# Patient Record
Sex: MALE | Race: WHITE
[De-identification: names, ages, dates, MRNs, and addresses within clinical notes are randomized per-mention and may not be internally consistent; named-entity substitution may affect disease eponyms.]

---

## 2018-07-31 ENCOUNTER — HOSPITAL ENCOUNTER (EMERGENCY)
Dept: HOSPITAL 25 - ED | Age: 58
Discharge: HOME | End: 2018-07-31
Payer: COMMERCIAL

## 2018-07-31 VITALS — DIASTOLIC BLOOD PRESSURE: 92 MMHG | SYSTOLIC BLOOD PRESSURE: 148 MMHG

## 2018-07-31 DIAGNOSIS — S49.92XA: Primary | ICD-10-CM
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
## 2018-07-31 DIAGNOSIS — F32.9: ICD-10-CM

## 2018-07-31 DIAGNOSIS — I10: ICD-10-CM

## 2018-07-31 PROCEDURE — 99282 EMERGENCY DEPT VISIT SF MDM: CPT

## 2018-07-31 NOTE — RAD
Indication: Left humerus injury.



2 views of left humerus demonstrates no fracture. Calcific tendinitis of the supraspinatus

tendon is noted.



IMPRESSION: NO DEFINITE FRACTURE OF THE LEFT HUMERUS IS NOTED.
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
## 2018-10-03 ENCOUNTER — HOSPITAL ENCOUNTER (OUTPATIENT)
Dept: HOSPITAL 25 - OR | Age: 58
Discharge: HOME | End: 2018-10-03
Attending: ORTHOPAEDIC SURGERY
Payer: COMMERCIAL

## 2018-10-03 VITALS — SYSTOLIC BLOOD PRESSURE: 125 MMHG | DIASTOLIC BLOOD PRESSURE: 87 MMHG

## 2018-10-03 DIAGNOSIS — M75.122: Primary | ICD-10-CM

## 2018-10-03 DIAGNOSIS — I10: ICD-10-CM

## 2018-10-03 DIAGNOSIS — G89.18: ICD-10-CM

## 2018-10-03 DIAGNOSIS — Z79.899: ICD-10-CM

## 2018-10-03 PROCEDURE — C1713 ANCHOR/SCREW BN/BN,TIS/BN: HCPCS
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
## 2019-01-17 ENCOUNTER — HOSPITAL ENCOUNTER (OUTPATIENT)
Dept: HOSPITAL 25 - ED | Age: 59
Setting detail: OBSERVATION
LOS: 1 days | Discharge: HOME | End: 2019-01-18
Attending: INTERNAL MEDICINE | Admitting: HOSPITALIST
Payer: COMMERCIAL
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
## 2019-01-17 DIAGNOSIS — I10: ICD-10-CM

## 2019-01-17 DIAGNOSIS — S06.0X9A: Primary | ICD-10-CM
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
## 2019-01-17 DIAGNOSIS — E78.5: ICD-10-CM

## 2019-01-17 LAB
ALBUMIN SERPL BCG-MCNC: 4.1 G/DL (ref 3.2–5.2)
ALBUMIN/GLOB SERPL: 1.2 {RATIO} (ref 1–3)
ALP SERPL-CCNC: 61 U/L (ref 34–104)
ALT SERPL W P-5'-P-CCNC: 30 U/L (ref 7–52)
ANION GAP SERPL CALC-SCNC: 8 MMOL/L (ref 2–11)
AST SERPL-CCNC: 23 U/L (ref 13–39)
BASOPHILS # BLD AUTO: 0.1 10^3/UL (ref 0–0.2)
BUN SERPL-MCNC: 24 MG/DL (ref 6–24)
BUN/CREAT SERPL: 22.2 (ref 8–20)
CALCIUM SERPL-MCNC: 9.8 MG/DL (ref 8.6–10.3)
CHLORIDE SERPL-SCNC: 103 MMOL/L (ref 101–111)
EOSINOPHIL # BLD AUTO: 0.3 10^3/UL (ref 0–0.6)
GLOBULIN SER CALC-MCNC: 3.4 G/DL (ref 2–4)
GLUCOSE SERPL-MCNC: 111 MG/DL (ref 70–100)
HCO3 SERPL-SCNC: 27 MMOL/L (ref 22–32)
HCT VFR BLD AUTO: 45 % (ref 42–52)
HGB BLD-MCNC: 15.2 G/DL (ref 14–18)
LYMPHOCYTES # BLD AUTO: 2.2 10^3/UL (ref 1–4.8)
MCH RBC QN AUTO: 29 PG (ref 27–31)
MCHC RBC AUTO-ENTMCNC: 34 G/DL (ref 31–36)
MCV RBC AUTO: 85 FL (ref 80–94)
MONOCYTES # BLD AUTO: 1.1 10^3/UL (ref 0–0.8)
NEUTROPHILS # BLD AUTO: 6 10^3/UL (ref 1.5–7.7)
NRBC # BLD AUTO: 0 10^3/UL
NRBC BLD QL AUTO: 0.1
PLATELET # BLD AUTO: 193 10^3/UL (ref 150–450)
POTASSIUM SERPL-SCNC: 4.5 MMOL/L (ref 3.5–5)
PROT SERPL-MCNC: 7.5 G/DL (ref 6.4–8.9)
RBC # BLD AUTO: 5.28 10^6/UL (ref 4–5.4)
SODIUM SERPL-SCNC: 138 MMOL/L (ref 135–145)
WBC # BLD AUTO: 9.7 10^3/UL (ref 3.5–10.8)

## 2019-01-17 PROCEDURE — 96374 THER/PROPH/DIAG INJ IV PUSH: CPT

## 2019-01-17 PROCEDURE — 93005 ELECTROCARDIOGRAM TRACING: CPT

## 2019-01-17 PROCEDURE — 83735 ASSAY OF MAGNESIUM: CPT

## 2019-01-17 PROCEDURE — 96375 TX/PRO/DX INJ NEW DRUG ADDON: CPT

## 2019-01-17 PROCEDURE — 71045 X-RAY EXAM CHEST 1 VIEW: CPT

## 2019-01-17 PROCEDURE — 80053 COMPREHEN METABOLIC PANEL: CPT

## 2019-01-17 PROCEDURE — 93306 TTE W/DOPPLER COMPLETE: CPT

## 2019-01-17 PROCEDURE — 85025 COMPLETE CBC W/AUTO DIFF WBC: CPT

## 2019-01-17 PROCEDURE — 99284 EMERGENCY DEPT VISIT MOD MDM: CPT

## 2019-01-17 PROCEDURE — G0378 HOSPITAL OBSERVATION PER HR: HCPCS

## 2019-01-17 PROCEDURE — 84484 ASSAY OF TROPONIN QUANT: CPT

## 2019-01-17 PROCEDURE — 72125 CT NECK SPINE W/O DYE: CPT

## 2019-01-17 PROCEDURE — 80048 BASIC METABOLIC PNL TOTAL CA: CPT

## 2019-01-17 PROCEDURE — 83036 HEMOGLOBIN GLYCOSYLATED A1C: CPT

## 2019-01-17 PROCEDURE — 83605 ASSAY OF LACTIC ACID: CPT

## 2019-01-17 PROCEDURE — 36415 COLL VENOUS BLD VENIPUNCTURE: CPT

## 2019-01-17 PROCEDURE — 80061 LIPID PANEL: CPT

## 2019-01-17 PROCEDURE — 70450 CT HEAD/BRAIN W/O DYE: CPT

## 2019-01-17 NOTE — HP
CC:  Dr. Finley *

 

ADMISSION HISTORY AND PHYSICAL:

 

DATE OF ADMISSION:  19

 

PRIMARY CARE PROVIDER:  Dr. Finley.

 

MY ATTENDING WHILE IN THE HOSPITAL:  Dr. Jelena Robert.* (DICTATED BY KAREN FERRO)

 

CHIEF COMPLAINT:  Fall on the ice with loss of consciousness and memory loss.

 

HISTORY OF PRESENT ILLNESS:  Mr. Reilly is a 58-year-old male with past 
medical history significant only for hypertension who presents to the emergency 
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 
which was confirmed with the hockey  of a team that was there as well as 
several members of the team that the patient was walking on the ice, slipped, 
fell backwards, and hit his head and had loss of consciousness for an unknown 
amount of time and then woke up.  The patient states that he remembers being in 
the office and then remembers being in the ambulance.  Does not remember 
anything in between.  The patient denies nausea, vomiting, headache, chest pain
, or shortness of breath.  The patient denies recent illness.  The patient 
denies palpitations.  The patient does not remember any prodromal symptoms to 
this episode.  The patient had a recent cough for a month related to upper 
respiratory infection, but this has now resolved.  The patient was in a normal 
state of health.  The patient denies dysuria, abdominal pain, diarrhea, 
dehydration, weight loss, weight gain, swelling in his legs, difficulty 
breathing when lying flat, dyspnea on exertion, or other alarming symptoms.  
The patient was brought in by EMS to the hospital.  The patient had a brain CT 
which was negative, electrocardiogram which was unremarkable. Cervical spine CT 
shows possible whiplash injury, but no other traumatic injury and a brain CT 
which showed no acute intracranial pathology.  Due to memory loss surrounding 
the patient's incident and his age and risk factors for arrhythmia, we were 
asked to evaluate the patient for admission.

 

PAST MEDICAL HISTORY:  Hypertension.

 

PAST SURGICAL HISTORY:  Left shoulder surgery x4.

 

MEDICATIONS:

1.  Lisinopril/hydrochlorothiazide 20/12.5 one tab p.o. q.a.m.

2.  Niacin 250 mg p.o. q.a.m.

 

ALLERGIES:  PERCOCET.

 

FAMILY HISTORY:  The patient's mother had a heart attack.  The patient's father 
 of a complicated appendix.  The patient's brother has hypertension.  The 
patient's 2 sisters have hypertension.  The patient has a brother who  of 
stomach cancer.

 

SOCIAL HISTORY:  The patient never smoked, drank, or used illicit drugs.  The 
patient works in an ice [employer] and is an .  The patient is , 
has 2 children.  The patient's surrogate decision maker will be his wife, Ruby Reilly.

 

REVIEW OF SYSTEMS:  A 14-point review of systems has been reviewed with the 
patient and is negative except as above in the HPI.

 

                               PHYSICAL EXAMINATION

 

GENERAL:  The patient is a 58-year-old male who appears stated age, sitting 
comfortably in bed, in no acute distress.

 

HEENT:  Head normocephalic.  The patient has a large contusion over his 
posterior scalp, no crepitus palpated.  Sclerae anicteric.  No conjunctival 
injection.  Nasal mucosa moist.  Oral mucosa moist.  No oropharyngeal erythema, 
discharge, or exudate.

 

NECK:  Supple, nontender.  No lymphadenopathy, no carotid bruits auscultated, 
no JVD.

 

RESPIRATORY:  Clear to auscultation bilaterally.  No wheezes, rales, or 
rhonchi. Good air exchange bilaterally.

 

CARDIAC:  Regular rate and rhythm.  No clicks, murmurs, gallops, or rubs.  
Pulses 2+ in the bilateral dorsalis pedis, posterior tibialis, and radial areas.

 

ABDOMEN:  Soft, nontender, and nondistended.  Bowel sounds present in all 4 
quadrants.  No hepatosplenomegaly.  No abdominal bruits auscultated.  No 
hepatojugular reflux.

 

GENITOURINARY:  No suprapubic or CVA tenderness.

 

SKIN:  Clean, dry, and intact.  No rashes except for above ecchymosis on the 
posterior scalp.

 

NEUROLOGIC:  Cranial nerves II through XII intact.  alert and oriented x3.  
Reflexes 2+ in bilateral biceps, patellar and Achilles areas.  Cerebellar 
testing performed without difficulty.  No nystagmus.

 

PSYCHIATRIC:  Pleasant and cooperative.

 

 LABORATORY DATA:  White blood cell count 9.7, hemoglobin 15.2, platelet count 
193,000.  Sodium 138, potassium 4.5, chloride 103, carbon dioxide 27, anion gap 
8, BUN 24, creatinine 1.08, lactic acid 1.6, calcium 9.8, bilirubin 0.3, AST 23
, ALT 30, alkaline phosphatase 61.  Troponin I of 0.00.  Protein 7.5, albumin 
4.1, globulin 3.4.

 

IMAGING:  Chest x-ray no official read, poor quality study, no acute 
cardiopulmonary disease to this author's interpretation.  



An EKG shows rate of 74; QTc of 428; early repolarization in V2, V3, and V4; 
normal axis; no hypertrophy or enlargement.  Compared with previous study, no 
significant changes.  



Brain CT read as no acute intracranial pathology.  



Cervical spine CT read as straightening of the cervical spine which may be 
muscular/ligamentous strain, multilevel vertebral  atrophy with neuroforaminal 
narrowing.  No acute abnormality.

 

ASSESSMENT AND PLAN/IMPRESSION:  Mr. Reilly is a 58-year-old male with past 
medical history significant only for hypertension who presents to the emergency 
department after a fall with a likely concussion, but will be monitored 
overnight to rule out syncopal episode.

 

1.  Loss of consciousness, concussion.  The patient had what appears to be a 
mechanical fall on the ice which was witnessed by a hockey  and several of 
his team members.  The patient does not remember anything after being in the 
office, before he walked on the ice, and before being in the ambulance on the 
way to the hospital.  The patient denies any prodromal symptoms.  The patient 
is not having any symptoms of heart failure or acute coronary syndrome at this 
time.  The patient has mild pain.  The patient will be admitted to the hospital
, have 3 troponins to rule out acute coronary syndrome.  The patient will have 
a repeat EKG in the morning and transthoracic echocardiogram to rule out any 
wall motion abnormalities which may predispose to arrhythmia.  The patient has 
no postictal symptoms, just amnesia.  It is very likely the patient has a 
concussion.  EEG is not indicated at this time unless the patient has a 
recurrent event.

2.  Hypertension.  Continue Zestoretic.

3.  DVT prophylaxis.  The patient is low risk and will have SCDs.

4.  FEN.  The patient will have a heart-healthy diet without caffeine and the 
patient does not need any fluids.

5.  Disposition.  The patient will be on observation.

 

TIME SPENT:  Approximately 45 minutes were spent on this admission, 30 of which 
was spent face-to-face with the patient obtaining history and physical and 
discussing treatment plan.

 

This plan was discussed with my attending, Dr. Jelena Robert and she is in 
agreement.

 

 ____________________________________ KAREN FERRO

 

[id number] #: [id number]

JASMINA

## 2019-01-17 NOTE — ED
Head Injury





- HPI Summary


HPI Summary: 





This patient is a 58 year old M brought in by EMS to [facility] with a chief 

complaint of head injury that occurred 30 minutes PTA. The patient was at work 

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 

There was a positive witnessed LOC with unknown length. The patient does not 

recall anything related to the fall. Patient reports HA. The patient rates the 

pain 5/10 in severity. The patient did recently have a URI. He does take 

Lisinopril. 





- History Of Current Complaint


Stated Complaint: FALL


Time Seen by Provider: 01/17/19 18:25


Hx Obtained From: Patient


Mechanism Of Injury: Fall From A Standing Position


Onset/Duration: Started Hours Ago - a half, Still Present


Onset of Pain: Immediate


Severity Currently: Mild


Severity Initially: Mild


Pain Intensity: 5


Pain Scale Used: 0-10 Numeric


Location of Head Injury: Occipital


Location: Diffuse


Associated Signs And Symptoms: LOC Duration Unknown, Memory Loss





- Allergies/Home Medications


Allergies/Adverse Reactions: 


 Allergies











Allergy/AdvReac Type Severity Reaction Status Date / Time


 


oxycodone Allergy Severe Hives Verified 10/03/18 08:14














PMH/Surg Hx/FS Hx/Imm Hx


Endocrine/Hematology History: 


   Denies: Hx Bone Marrow Disease, Hx Diabetes, Hx Sickle Cell Disease, Hx 

Anemia


Cardiovascular History: Reports: Hx Hypertension - ON MEDICATION FOR


   Denies: Hx Pacemaker/ICD


Respiratory History: 


   Denies: Hx Asthma, Other Respiratory Problems/Disorders


GI History: Reports: Other GI Disorders - HX DIVERTICULOSIS


 History: 


   Denies: Hx Dialysis, Hx Renal Disease


Musculoskeletal History: Reports: Hx Arthritis - LEFT SHOULDER, Hx Bursitis, 

Other Musculoskeletal History - LEFT SHOULDER ROTATOR CUFF REPAIR X 3


Sensory History: Reports: Hx Contacts or Glasses - GLASSES


   Denies: Hx Cataracts, Hx Glaucoma, Hx Hearing Aid


Opthamlomology History: Reports: Hx Contacts or Glasses - GLASSES


   Denies: Hx Cataracts, Hx Glaucoma


Neurological History: Reports: Other Neuro Impairments/Disorders - LEFT 

SHOULDER WEAKNESS, PAIN


Psychiatric History: Reports: Hx Depression - CURRENTLY NO MEDICATION FOR


   Denies: Hx Panic Disorder





- Surgical History


Surgery Procedure, Year, and Place: 3/2012- 05/2015 LEFT SHOULDER SURGERY X3 

[facility] [place]  TONSILLECTOMY AND ADENOIDECTOMY AS A CHILD.  RIGHT EAR SURGERY (IN OFFICE

, BONE GROUND DOWN-NO IMPLANTS) [place] 2010.  LEFT SHOULDER ROTATOR CUFF 

REPAIR 3/12 [facility].  LEFT SHOULDER ROTATOR CUFF REPAIR 11/13 [facility].  LEFT SHOULDER 

SURGERY 3/15 [facility]


Hx Anesthesia Reactions: No


Infectious Disease History: No


Infectious Disease History: 


   Denies: Hx Clostridium Difficile, Hx Hepatitis, Hx Human Immunodeficiency 

Virus (HIV), Hx of Known/Suspected MRSA, Hx Shingles, Hx Tuberculosis, Hx Known/

Suspected VRE, Hx Known/Suspected VRSA, History Other Infectious Disease, 

Traveled Outside the [place] in Last 30 Days





- Family History


Known Family History: Positive: Cardiac Disease





- Social History


Alcohol Use: None


Substance Use Type: Reports: None


Smoking Status (MU): Never Smoked Tobacco


Have You Smoked in the Last Year: No





Review of Systems


Constitutional: Other - fall 


Negative: Fever, Chills


Negative: Erythema


Negative: Sore Throat


Negative: Chest Pain


Negative: Shortness Of Breath, Cough


Negative: Abdominal Pain, Vomiting, Nausea


Negative: dysuria, hematuria


Negative: Myalgia, Edema


Negative: Rash


Neurological: Negative - dizziness 


Positive: Headache, Syncope - w/ memory loss 


All Other Systems Reviewed And Are Negative: Yes





Physical Exam





- Summary


Physical Exam Summary: 





Constitutional: Well-developed, Well-nourished, Alert. (-) Distressed


Skin: Warm, Dry


HENT: there is an abrasion on the occipital area. 


Eyes: Conjunctiva normal


Neck: Musculoskeletal ROM normal neck. (-) JVD, (-) Stridor, (-) Tracheal 

deviation


Cardio: Rhythm regular, rate normal, Heart sounds normal; Intact distal pulses; 

The pedal pulses are 2+ and symmetric. Radial pulses are 2+ and symmetric. (-) 

Murmur


Pulmonary/Chest wall: Effort normal. (-) Respiratory distress, (-) Wheezes, (-) 

Rales


Abd: Soft. (-) Tenderness, (-) Distension, (-) Guarding, (-) Rebound


Musculoskeletal: (-) Edema


Lymph: (-) Cervical adenopathy


Neuro: Alert, Oriented x3, Strength normal, Cranial nerves II-XII are grossly 

intact. (-) Dysmetria, (-) Nystagmus, (-) Ataxia by finger to nose testing, (-) 

Sensory deficit.


Psych: Mood and affect Normal





Triage Information Reviewed: Yes


Vital Signs On Initial Exam: 


 Initial Vitals











Temp Pulse Resp BP Pulse Ox


 


 99.8 F   83   18   145/82   97 


 


 01/17/19 18:24  01/17/19 18:24  01/17/19 18:24  01/17/19 18:24  01/17/19 18:24











Vital Signs Reviewed: Yes





- Machelle Coma Scale


Best Eye Response: 4 - Spontaneous


Best Motor Response: 6 - Obeys Commands


Best Verbal Response: 5 - Oriented


Coma Scale Total: 15





Diagnostics





- Vital Signs


 Vital Signs











  Temp Pulse Resp BP Pulse Ox


 


 01/17/19 18:24  99.8 F  83  18  145/82  97














- Laboratory


Result Diagrams: 


 01/17/19 18:54





 01/17/19 18:54


Lab Statement: Any lab studies that have been ordered have been reviewed, and 

results considered in the medical decision making process.





- Radiology


  ** CXR


Radiology Interpretation Completed By: ED Physician


Summary of Radiographic Findings: no acute disease . Pending official report





- CT


  ** CT Cspine


CT Interpretation Completed By: Radiologist


Summary of CT Findings: No acute abnormality.  Straightening of the cervical 

spine which may be muscular/ligamentous strain.  Multilevel uncovertebral and 

facet Atrophic with neural foramina narrowing.  ED physician has reviewed this 

radiology report.





  ** CT brain


CT Interpretation Completed By: Radiologist


Summary of CT Findings: No acute intracranial abnormality.  Chronic 

microvascular ischemic changes.  ED physician has reviewed this radiology 

report.





- EKG


  ** 1912


Cardiac Rate: NL


EKG Rhythm: Sinus Rhythm - at 74 BPM


Summary of EKG Findings: no STEMI





Re-Evaluation





- Re-Evaluation


  ** First Eval


Re-Evaluation Time: 20:34


Change: Unchanged


Comment: The patient was offered an ice pack and pain medications and he 

declined both. He does have a stable mental status.





Head Injury Course/Dx


Assessment/Plan: This patient is a 58 year old M brought in by EMS to [facility] 

with a chief complaint of head injury that occurred 30 minutes PTA. The patient 

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 

his head. There was a positive witnessed LOC with unknown length. The patient 

does not recall anything related to the fall. Patient reports HA. The patient 

rates the pain 5/10 in severity. The patient did recently have a URI. He does 

take Lisinopril.  An EKG reveals NSR.  CT Brain reveals, per radiologist, No 

acute intracranial abnormality.  Chronic microvascular ischemic changes.  CXR 

reveals, per radiologist, no acute disease.  CT C-spine reveals, per radiologist

, No acute abnormality.  Straightening of the cervical spine which may be 

muscular/ligamentous strain.  Multilevel uncovertebral and facet Atrophic with 

neural foramina narrowing.  Bloodwork obtained.  In the ED course the patient 

was given tylenol.  The LOC was most likely secondary to concussion. Although 

since he has no recollection the patient will be admitted to r/o [facility].  

We discussed patient care with Dr. Marrero and she has accepted the patient for 

admision.  Patient will be admitted.  The patient is agreeable with this plan.





- Diagnoses


Provider Diagnoses: 


 Closed head injury, LOC (loss of consciousness)








- Physician Notifications


Discussed Care Of Patient With: Jelena Marrero


Time Discussed With Above Provider: 20:50


Instructed by Provider To: Admit As Inpatient





Discharge





- Sign-Out/Discharge


Documenting (check all that apply): Patient Departure - admitted 





- Discharge Plan


Condition: Fair


Disposition: ADMITTED TO [facility]


Referrals: 


Myesha CASILLAS,Alysha COSTA [Primary Care Provider] - 





- Attestation Statements


Document Initiated by Scribe: Yes


Documenting Scribe: Fady Garay 


Provider For Whom Scribe is Documenting (Include Credential): Mj Chamorro MD 


Scribe Attestation: 


Fady SELF , scribed for Mj Chamorro MD  on 01/17/19 at 2058. 


Status of Scribe Document: Ready

## 2019-01-18 VITALS — DIASTOLIC BLOOD PRESSURE: 70 MMHG | SYSTOLIC BLOOD PRESSURE: 110 MMHG

## 2019-01-18 LAB
ANION GAP SERPL CALC-SCNC: 8 MMOL/L (ref 2–11)
BUN SERPL-MCNC: 21 MG/DL (ref 6–24)
BUN/CREAT SERPL: 21.4 (ref 8–20)
CALCIUM SERPL-MCNC: 9.2 MG/DL (ref 8.6–10.3)
CHLORIDE SERPL-SCNC: 105 MMOL/L (ref 101–111)
CHOLEST SERPL-MCNC: 137 MG/DL
GLUCOSE SERPL-MCNC: 112 MG/DL (ref 70–100)
HCO3 SERPL-SCNC: 24 MMOL/L (ref 22–32)
HDLC SERPL-MCNC: 36.3 MG/DL
MAGNESIUM SERPL-MCNC: 2 MG/DL (ref 1.9–2.7)
POTASSIUM SERPL-SCNC: 4.3 MMOL/L (ref 3.5–5)
SODIUM SERPL-SCNC: 137 MMOL/L (ref 135–145)
TRIGL SERPL-MCNC: 120 MG/DL

## 2019-01-18 NOTE — ECHO
Patient:      MARK MYERS   

Med Rec#:     [id number]            :          1960          

Date:         2019            Age:          58y                 

Account#:     [id number]          Height:       183 cm / 72.0 in

Accession#:   [id number]           Weight:       129 kg / 284.3 lbs

Sex:          M                     BSA:          2.48

Room#:        [facility]                   

Admit Date#:  2019          

Type:         Inpatient

 

Referring:    Cliff Bullock

Reading:      Qutaybeh Maghaydah, MD

Sonographer:  Freda Villalobos RDCS

CC:           Guero Finley MD

______________________________________________________________________

 

Transthoracic Echocardiogram

 

Indication:

Syncope

BP:           121/72

HR:           89

Rhythm:       NSR

 

Findings     

History:

Fell PTA, HTN,syncope. 

 

Technical Comments:

The study quality is fair.   Completed at 1025. 

 

Left Ventricle:

The left ventricular chamber size is normal. Mild concentric left

ventricular hypertrophy is observed. There is normal left ventricular

systolic function. The estimated ejection fraction is 55-60%.  Abnormal

left ventricular diastolic function is observed. 

 

Left Atrium:

The left atrial chamber size is normal. 

 

Right Ventricle:

The right ventricular cavity size is normal. The right ventricular

global systolic function is normal. 

 

Right Atrium:

The right atrial cavity size is normal. 

 

Aortic Valve:

The aortic valve is trileaflet. There is no evidence of aortic

regurgitation. There is no evidence of aortic stenosis. 

 

Mitral Valve:

The mitral valve leaflets are mildly thickened. There is no evidence of

mitral regurgitation. There is no evidence of mitral stenosis. 

 

Tricuspid Valve:

The tricuspid valve leaflets are normal.  There is no evidence of

tricuspid valve regurgitation. There is no tricuspid stenosis. 

 

Pulmonic Valve:

The pulmonic valve appears normal. There is no evidence of pulmonic

regurgitation. There is no pulmonic stenosis.  

 

Pericardium:

The pericardium appears normal. 

 

Aorta:

There is no dilatation of the ascending aorta. There is no dilatation of

the aortic arch. There is no dilation of the aortic root. 

 

Pulmonary Artery:

The main pulmonary artery appears normal. 

 

Venous:

The venous system is not well visualized. 

 

Summary:

There was not any prior study for comparison. 

 

Conclusions

The left ventricular chamber size is normal.

Mild concentric left ventricular hypertrophy is observed.

The estimated ejection fraction is 55-60%. 

Abnormal left ventricular diastolic function is observed.

No significant valvular disease.

 

Measurements     

Name                    Value         Normal Range            

RVIDd (AP) 2D           2.8 cm        (0.9 - 2.6)             

RVDdMajor (2D)          3 cm          (2.2 - 4.4)             

RAd ISD 4CH             5.5 cm        (3.4 - 4.9)             

RA (A4C)W               3.2 cm        (2.9 - 4.6)             

IVSd (2D)               1.1 cm        (0.6 - 1)               

LVPWd (2D)              1.2 cm        (0.6 - 1)               

LVIDd (2D)              5.2 cm        (3.6 - 5.4)             

LVIDs (2D)              3.3 cm        -                        

LV FS (2D)              37 %          (25 - 45)               

Aortic Annulus          2.1 cm        (1.4 - 2.6)             

Ao root diameter (2D)   3 cm          (2.1 - 3.5)             

Ascending Ao            3.4 cm        (2.1 - 3.4)             

Aortic arch             3.2 cm        (1.8 - 3.4)             

Descending Ao           0.6 cm        -                        

LA dimension (AP) 2D    3.7 cm        (2.3 - 3.8)             

LAd ISD 4CH             5.2 cm        (2.9 - 5.3)             

LA ISD 4CH W            3.8 cm        (2.5 - 4.5)             

 

Name                    Value         Normal Range            

LA ESV SP 4CH (A/L)     17 ml         -                        

LA ESV SP 2CH (A/L)     31 ml         -                        

LA ESV BP (A/L) index   23.4 ml/m2    -                        

 

Name                    Value         Normal Range            

MV E-wave Vmax          0.6 m/sec     -                        

MV deceleration time    278 msec      -                        

MV A-wave Vmax          0.8 m/sec     -                        

MV E:A ratio            0.8 ratio     -                        

LV septal e' Vmax       0.1 m/sec     -                        

LV E:e' septal ratio    6 ratio       -                        

 

Name                    Value         Normal Range            

AV Vmax                 1.5 m/sec     -                        

AV VTI                  26 cm         -                        

AV peak gradient        9 mmHg        -                        

AV mean gradient        4 mmHg        -                        

LVOT Vmax               1.1 m/sec     -                        

LVOT VTI                21 cm         -                        

LVOT peak gradient      5 mmHg        -                        

LVOT mean gradient      2 mmHg        -                        

 

Name                    Value         Normal Range            

PV Vmax                 1.1 m/sec     -                        

PV peak gradient        5 mmHg        -                        

 

Electronically signed by: Qutaybeh Maghaydah, MD on 2019 11:20:23

## 2019-01-19 NOTE — DS
CC:  Dr. Finley

 

DISCHARGE SUMMARY:

 

DATE OF ADMISSION:  01/17/19

 

DATE OF DISCHARGE:  01/18/19

 

PRIMARY CARE PROVIDER:  Dr. Finley.

 

ATTENDING PHYSICIAN:  Dr. Mendoza (dictated by Callie Torres NP).

 

PRIMARY DIAGNOSES:

1.  Mechanical fall with resulting concussion.

2.  Hyperlipidemia.

 

SECONDARY DIAGNOSIS:  

1.  Hypertension.

 

STUDIES WHILE IN THE HOSPITAL:

1.  Chest x-ray on 01/17/19, reads as no evidence for acute intrathoracic 
disease.

2.  EKG on 01/17/19, shows normal sinus rhythm with a rate of 74, QTc 428, no 
ischemic changes.

3.  Brain CT on 01/17/19, reads as no acute intracranial abnormality.  Chronic 
microvascular ischemic changes.

4.  Cervical spine CT on 01/17/19, reads as no acute abnormality.  
Straightening of the cervical spine which may be muscular/ligamentous strain.  
Multilevel uncovertebral and facet atrophic with neural foraminal narrowing.

5.  EKG on 01/18/19, shows normal sinus rhythm with a rate of 80, QTc 440, no 
ischemic changes.  No changes from previous EKG.

6.  Transthoracic echocardiogram on 01/18/19, reads as the left ventricular 
chamber size is normal.  Mild concentric left ventricular hypertrophy is 
observed.  The estimated ejection fraction is 55% to 60%.  Abnormal left 
ventricular diastolic function is observed.  No significant valvular disease.

 

HISTORY OF PRESENT ILLNESS AND HOSPITAL COURSE:  Mr. Reilly is a 58-year-old 
male with past medical history of hypertension, who presented through the 
emergency room on 01/17/19 after a mechanical fall with loss of consciousness.  
Please see the history and physical by KAREN Coburn for complete summary 
of the events leading up to this hospitalization.  In short, the patient works 
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 
he hit his head.  He lost consciousness for an unknown period of time and was 
brought to the emergency room by EMS.  The patient does have memory of being in 
the ambulance, though does not remember anything prior to that after the fall.  
He had no nausea, vomiting, headache, chest pain, or shortness of breath.  He 
had imaging as noted above which was essentially unremarkable.  Due to his risk 
factors, the patient was admitted by the hospitalist service for loss of 
consciousness.

 

The patient had an uneventful night.  He had a lipid panel which showed 
triglycerides of 120, total cholesterol of 137, LDL of 77, and HDL of 36.  He 
had an A1c of 6.1%.  He had an echocardiogram as noted above.  Syncope workup 
has been unremarkable.  The patient reports feeling well today.  He has no 
complaints of nausea, vomiting, dizziness, headache, difficulty concentrating, 
or visual changes. He has no focal neurological deficits.  He still does not 
have any memory of the fall itself, though remembers working on the ice and 
slipping and remembers waking up in the ambulance.  Because of his unremarkable 
workup, there are no cardiac or further neurological concerns at this point.

 

Mr. Reilly is stable for discharge today.  Vital signs are as follows:  Temp 
98.1, heart rate 77, respiratory rate 16, oxygen saturation 97% on room air, 
blood pressure 110/70.

 

DISCHARGE MEDICATIONS:  

New medications:  Atorvastatin 10 mg p.o. at bedtime.

 

Continued medications:  Lisinopril/hydrochlorothiazide 20/12.5 one tab p.o. 
daily.

 

Discontinued medications:  Niacin.

 

DISCHARGE PLAN:  Mr. Reilly will be discharged home with his wife.  Activity 
will be as tolerated, though I have advised him to take it easy for the next 
few days due to his concussion.  Diet will be regular as tolerated.  I did 
speak with the patient about his elevated A1c which does put him in the range 
for prediabetes and encouraged weight loss and diet changes.  I also spoke with 
the patient about his lipid panel and although lipids are under good control, 
according to the ASCVD risk algorithm, the patient has a 6.7% risk of 
cardiovascular event in the next 10 years; and therefore, a moderate intensity 
statin is recommended.  I did speak with the patient about diet changes, though 
he is agreeable to starting a statin at this point for primary prevention.  He 
should discontinue his niacin due to the possible interaction with 
atorvastatin.  He can continue his lisinopril/hydrochlorothiazide.  He should 
follow up with his primary care provider in 4 to 7 days.  He has been advised 
to return to the emergency room or nearest hospital for any worsening of 
symptoms, shortness of breath, lightheadedness, dizziness, chest discomfort, 
high fevers, chills, night sweats, loss of consciousness, or any other 
worrisome signs or symptoms.

 

This is a summarized report of a complex medical history and hospital stay.  
For further details, please see the entire medical record.

 

TIME SPENT:  Approximately 35 minutes were spent on this discharge.

 

____________________________________ CALLIE TORRES, PAULETTE

 

[id number]/CPS #: [id number]

[id number]